# Patient Record
Sex: FEMALE | Race: BLACK OR AFRICAN AMERICAN | NOT HISPANIC OR LATINO
[De-identification: names, ages, dates, MRNs, and addresses within clinical notes are randomized per-mention and may not be internally consistent; named-entity substitution may affect disease eponyms.]

---

## 2018-03-14 PROBLEM — Z00.00 ENCOUNTER FOR PREVENTIVE HEALTH EXAMINATION: Status: ACTIVE | Noted: 2018-03-14

## 2018-03-29 ENCOUNTER — RECORD ABSTRACTING (OUTPATIENT)
Age: 43
End: 2018-03-29

## 2018-03-29 DIAGNOSIS — Z78.9 OTHER SPECIFIED HEALTH STATUS: ICD-10-CM

## 2018-03-29 DIAGNOSIS — Z83.3 FAMILY HISTORY OF DIABETES MELLITUS: ICD-10-CM

## 2018-03-29 DIAGNOSIS — Z82.49 FAMILY HISTORY OF ISCHEMIC HEART DISEASE AND OTHER DISEASES OF THE CIRCULATORY SYSTEM: ICD-10-CM

## 2018-05-02 ENCOUNTER — RX RENEWAL (OUTPATIENT)
Age: 43
End: 2018-05-02

## 2018-05-02 ENCOUNTER — APPOINTMENT (OUTPATIENT)
Dept: OBGYN | Facility: CLINIC | Age: 43
End: 2018-05-02
Payer: COMMERCIAL

## 2018-05-02 VITALS
DIASTOLIC BLOOD PRESSURE: 64 MMHG | WEIGHT: 167 LBS | BODY MASS INDEX: 32.79 KG/M2 | SYSTOLIC BLOOD PRESSURE: 122 MMHG | HEIGHT: 60 IN

## 2018-05-02 VITALS — HEIGHT: 65 IN

## 2018-05-02 LAB
HCG UR QL: NEGATIVE
QUALITY CONTROL: YES

## 2018-05-02 PROCEDURE — 81025 URINE PREGNANCY TEST: CPT

## 2018-05-02 PROCEDURE — 99213 OFFICE O/P EST LOW 20 MIN: CPT

## 2018-06-05 ENCOUNTER — RECORD ABSTRACTING (OUTPATIENT)
Age: 43
End: 2018-06-05

## 2018-09-17 ENCOUNTER — RX RENEWAL (OUTPATIENT)
Age: 43
End: 2018-09-17

## 2018-10-10 ENCOUNTER — RX RENEWAL (OUTPATIENT)
Age: 43
End: 2018-10-10

## 2019-02-11 ENCOUNTER — APPOINTMENT (OUTPATIENT)
Dept: OBGYN | Facility: CLINIC | Age: 44
End: 2019-02-11

## 2019-03-15 ENCOUNTER — RX RENEWAL (OUTPATIENT)
Age: 44
End: 2019-03-15

## 2019-03-26 ENCOUNTER — RX RENEWAL (OUTPATIENT)
Age: 44
End: 2019-03-26

## 2019-03-26 ENCOUNTER — RX CHANGE (OUTPATIENT)
Age: 44
End: 2019-03-26

## 2019-04-01 ENCOUNTER — APPOINTMENT (OUTPATIENT)
Dept: OBGYN | Facility: CLINIC | Age: 44
End: 2019-04-01
Payer: COMMERCIAL

## 2019-04-01 VITALS
BODY MASS INDEX: 30.49 KG/M2 | DIASTOLIC BLOOD PRESSURE: 68 MMHG | WEIGHT: 183 LBS | SYSTOLIC BLOOD PRESSURE: 124 MMHG | HEIGHT: 65 IN

## 2019-04-01 PROCEDURE — 99213 OFFICE O/P EST LOW 20 MIN: CPT

## 2019-04-01 PROCEDURE — 81025 URINE PREGNANCY TEST: CPT

## 2019-04-01 NOTE — DISCUSSION/SUMMARY
[FreeTextEntry1] : a44 years old black  female  here for follow up ,she wants to get her period only every 3months only will start on seasoneque physical and pelvic exam wnl  i spent 15 minutes with the patient

## 2019-04-02 LAB
HCG UR QL: NEGATIVE
QUALITY CONTROL: YES

## 2019-04-16 ENCOUNTER — APPOINTMENT (OUTPATIENT)
Dept: MAMMOGRAPHY | Facility: CLINIC | Age: 44
End: 2019-04-16
Payer: COMMERCIAL

## 2019-04-16 PROCEDURE — 77063 BREAST TOMOSYNTHESIS BI: CPT

## 2019-04-16 PROCEDURE — 77067 SCR MAMMO BI INCL CAD: CPT

## 2019-06-29 ENCOUNTER — RX RENEWAL (OUTPATIENT)
Age: 44
End: 2019-06-29

## 2019-08-26 ENCOUNTER — APPOINTMENT (OUTPATIENT)
Dept: OBGYN | Facility: CLINIC | Age: 44
End: 2019-08-26
Payer: COMMERCIAL

## 2019-08-26 VITALS
DIASTOLIC BLOOD PRESSURE: 74 MMHG | HEIGHT: 65 IN | BODY MASS INDEX: 29.99 KG/M2 | SYSTOLIC BLOOD PRESSURE: 126 MMHG | WEIGHT: 180 LBS

## 2019-08-26 DIAGNOSIS — Z87.42 PERSONAL HISTORY OF OTHER DISEASES OF THE FEMALE GENITAL TRACT: ICD-10-CM

## 2019-08-26 LAB
HCG UR QL: NEGATIVE
QUALITY CONTROL: YES

## 2019-08-26 PROCEDURE — 99396 PREV VISIT EST AGE 40-64: CPT

## 2019-08-27 PROBLEM — Z87.42 HISTORY OF IRREGULAR MENSTRUAL CYCLES: Status: RESOLVED | Noted: 2018-03-29 | Resolved: 2019-08-27

## 2019-08-27 RX ORDER — LEVONORGESTREL / ETHINYL ESTRADIOL AND ETHINYL ESTRADIOL 150-30(84)
0.15-0.03 &0.01 KIT ORAL DAILY
Qty: 91 | Refills: 1 | Status: DISCONTINUED | COMMUNITY
Start: 2019-04-01 | End: 2019-08-27

## 2019-08-27 RX ORDER — LEVONORGESTREL AND ETHINYL ESTRADIOL 0.15-0.03
0.15-0.03 KIT ORAL DAILY
Qty: 90 | Refills: 0 | Status: DISCONTINUED | COMMUNITY
Start: 2018-10-10 | End: 2019-08-27
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
## 2019-08-27 NOTE — DISCUSSION/SUMMARY
[Combined Oral Contraception] : combined oral contraception [Pregnancy Prevention] : pregnancy prevention [Bleeding Control] : bleeding control

## 2019-08-27 NOTE — HISTORY OF PRESENT ILLNESS
[1 Year Ago] : 1 year ago [Good] : being in good health [Healthy Diet] : a healthy diet [Pap Smear Last Year] : Papanicolaou cytology last year [Last Mammogram ___] : last mammogram done [unfilled] [Performed Within 5 Years] : lipid profile performed within the past five years [Reproductive Age] : is of reproductive age [Never Performed] : no previous DEXA [Contraception] : uses contraception [Regular Exercise] : not exercising regularly [Menstrual Problems] : reports normal menses [Fever] : no fever [Vomiting] : no vomiting [Nausea] : no nausea [Diarrhea] : no diarrhea [Vaginal Bleeding] : no vaginal bleeding [Pelvic Pressure] : no pelvic pressure [Dysuria] : no dysuria

## 2019-08-27 NOTE — PHYSICAL EXAM
[Awake] : awake [Alert] : alert [Soft] : soft [Oriented x3] : oriented to person, place, and time [No Bleeding] : there was no active vaginal bleeding [Normal] : uterus [Uterine Adnexae] : were not tender and not enlarged [Acute Distress] : no acute distress [Mass] : no breast mass [Nipple Discharge] : no nipple discharge [Tender] : non tender [Axillary LAD] : no axillary lymphadenopathy

## 2019-08-28 LAB — HPV HIGH+LOW RISK DNA PNL CVX: NOT DETECTED

## 2019-08-30 LAB — CYTOLOGY CVX/VAG DOC THIN PREP: NORMAL

## 2019-09-03 ENCOUNTER — OTHER (OUTPATIENT)
Age: 44
End: 2019-09-03

## 2019-09-03 RX ORDER — NORGESTREL AND ETHINYL ESTRADIOL 0.3-0.03MG
0.3-3 KIT ORAL DAILY
Qty: 90 | Refills: 1 | Status: DISCONTINUED | COMMUNITY
Start: 2018-05-02 | End: 2019-09-03

## 2019-09-03 RX ORDER — NORGESTREL AND ETHINYL ESTRADIOL 0.3-0.03MG
0.3-3 KIT ORAL DAILY
Qty: 3 | Refills: 0 | Status: DISCONTINUED | COMMUNITY
Start: 2019-03-25 | End: 2019-09-03

## 2019-09-03 RX ORDER — NORGESTREL AND ETHINYL ESTRADIOL 0.3-0.03MG
0.3-3 KIT ORAL DAILY
Qty: 3 | Refills: 1 | Status: DISCONTINUED | COMMUNITY
Start: 2019-03-20 | End: 2019-09-03

## 2019-11-06 ENCOUNTER — MEDICATION RENEWAL (OUTPATIENT)
Age: 44
End: 2019-11-06

## 2020-05-18 ENCOUNTER — APPOINTMENT (OUTPATIENT)
Dept: OBGYN | Facility: CLINIC | Age: 45
End: 2020-05-18
Payer: COMMERCIAL

## 2020-05-18 PROCEDURE — 99213 OFFICE O/P EST LOW 20 MIN: CPT | Mod: 95

## 2020-07-24 ENCOUNTER — APPOINTMENT (OUTPATIENT)
Dept: ULTRASOUND IMAGING | Facility: CLINIC | Age: 45
End: 2020-07-24
Payer: COMMERCIAL

## 2020-07-24 ENCOUNTER — APPOINTMENT (OUTPATIENT)
Dept: MAMMOGRAPHY | Facility: CLINIC | Age: 45
End: 2020-07-24
Payer: COMMERCIAL

## 2020-07-24 ENCOUNTER — RESULT REVIEW (OUTPATIENT)
Age: 45
End: 2020-07-24

## 2020-07-24 PROCEDURE — 77063 BREAST TOMOSYNTHESIS BI: CPT

## 2020-07-24 PROCEDURE — 77067 SCR MAMMO BI INCL CAD: CPT

## 2020-07-24 PROCEDURE — 76641 ULTRASOUND BREAST COMPLETE: CPT | Mod: 50

## 2020-08-31 ENCOUNTER — APPOINTMENT (OUTPATIENT)
Dept: OBGYN | Facility: CLINIC | Age: 45
End: 2020-08-31
Payer: COMMERCIAL

## 2020-08-31 VITALS
DIASTOLIC BLOOD PRESSURE: 64 MMHG | HEIGHT: 65 IN | SYSTOLIC BLOOD PRESSURE: 120 MMHG | BODY MASS INDEX: 29.99 KG/M2 | WEIGHT: 180 LBS | TEMPERATURE: 98.1 F

## 2020-08-31 PROCEDURE — 99396 PREV VISIT EST AGE 40-64: CPT

## 2020-08-31 NOTE — CHIEF COMPLAINT
[Annual Visit] : annual visit [FreeTextEntry1] : 46 y/o P2 for annual and ocp refill. Pap was NILM,neg HPV 8/2019. MAmmo was Birad 1 7/2020.\par Pt on ocp desires to continue\par Reports she has HSV which comes in the form of one sore in the same place approx 4x a year. Takes one Valtrex that is prescribed by her primary MD and it goes away\par Accepts gc.ct today-no other STI testing done\par No breast or ovarian cancer in family

## 2020-08-31 NOTE — PHYSICAL EXAM
[Awake] : awake [Alert] : alert [Acute Distress] : no acute distress [Thyroid Nodule] : no thyroid nodule [LAD] : no lymphadenopathy [Mass] : no breast mass [Goiter] : no goiter [Nipple Discharge] : no nipple discharge [Axillary LAD] : no axillary lymphadenopathy [Soft] : soft [Tender] : non tender [Oriented x3] : oriented to person, place, and time [Normal] : uterus [No Bleeding] : there was no active vaginal bleeding [Uterine Adnexae] : were not tender and not enlarged

## 2020-09-02 LAB
C TRACH RRNA SPEC QL NAA+PROBE: NOT DETECTED
N GONORRHOEA RRNA SPEC QL NAA+PROBE: NOT DETECTED
SOURCE AMPLIFICATION: NORMAL

## 2021-09-15 ENCOUNTER — APPOINTMENT (OUTPATIENT)
Dept: OBGYN | Facility: CLINIC | Age: 46
End: 2021-09-15
Payer: COMMERCIAL

## 2021-09-15 VITALS
BODY MASS INDEX: 29.16 KG/M2 | WEIGHT: 175 LBS | SYSTOLIC BLOOD PRESSURE: 106 MMHG | DIASTOLIC BLOOD PRESSURE: 80 MMHG | HEIGHT: 65 IN

## 2021-09-15 PROCEDURE — 99396 PREV VISIT EST AGE 40-64: CPT

## 2021-09-15 NOTE — HISTORY OF PRESENT ILLNESS
[FreeTextEntry1] : Angie 47 y/o pronounced "Richie" presents for annual exam without complaints. \par Her son is a senior in high school playing football and preparing for high school-very involved in that\par Monthly menses not to heavy-on ocp and desires to continue\par Taking suppressive therapy as prescribed and went from an outbreak every other month to none this past year\par Mammo was BIRAD 1 6/2020\par Never had a colonoscopy-\par Sexuallyactive with one partner-declines STI testing

## 2021-09-15 NOTE — PHYSICAL EXAM
[Appropriately responsive] : appropriately responsive [Alert] : alert [No Acute Distress] : no acute distress [No Lymphadenopathy] : no lymphadenopathy [No Murmurs] : no murmurs [Soft] : soft [Non-tender] : non-tender [Non-distended] : non-distended [No HSM] : No HSM [No Lesions] : no lesions [No Mass] : no mass [Oriented x3] : oriented x3 [Examination Of The Breasts] : a normal appearance [No Masses] : no breast masses were palpable [Labia Minora] : normal [Labia Majora] : normal [Normal] : normal [Normal Position] : in a normal position [Enlarged ___ wks] : enlarged [unfilled] ~Uweeks [Uterine Adnexae] : non-palpable

## 2021-09-15 NOTE — DISCUSSION/SUMMARY
[FreeTextEntry1] : 45 y/o with normal exam except uterus palpates enlarged-pt not having HMB/IMB but will perform sono \par MAmmo rx'd\par No pap collected-NILM,neg HPV in 2019 and never had an abnomral pap\par Information regarding cologuard given and pt given GI referral for first colonoscopy\par Refilling ocp and valtrex

## 2021-10-11 ENCOUNTER — ASOB RESULT (OUTPATIENT)
Age: 46
End: 2021-10-11

## 2021-10-11 ENCOUNTER — APPOINTMENT (OUTPATIENT)
Dept: OBGYN | Facility: CLINIC | Age: 46
End: 2021-10-11
Payer: COMMERCIAL

## 2021-10-11 PROCEDURE — 76856 US EXAM PELVIC COMPLETE: CPT | Mod: 59

## 2021-10-11 PROCEDURE — 76830 TRANSVAGINAL US NON-OB: CPT

## 2021-10-26 ENCOUNTER — APPOINTMENT (OUTPATIENT)
Dept: OBGYN | Facility: CLINIC | Age: 46
End: 2021-10-26

## 2021-11-09 ENCOUNTER — APPOINTMENT (OUTPATIENT)
Dept: OBGYN | Facility: CLINIC | Age: 46
End: 2021-11-09

## 2022-05-24 ENCOUNTER — APPOINTMENT (OUTPATIENT)
Dept: ULTRASOUND IMAGING | Facility: CLINIC | Age: 47
End: 2022-05-24

## 2022-05-24 ENCOUNTER — RESULT REVIEW (OUTPATIENT)
Age: 47
End: 2022-05-24

## 2022-05-24 ENCOUNTER — APPOINTMENT (OUTPATIENT)
Dept: MAMMOGRAPHY | Facility: CLINIC | Age: 47
End: 2022-05-24
Payer: COMMERCIAL

## 2022-05-24 PROCEDURE — 77067 SCR MAMMO BI INCL CAD: CPT

## 2022-05-24 PROCEDURE — 76641 ULTRASOUND BREAST COMPLETE: CPT | Mod: 50

## 2022-05-24 PROCEDURE — 77063 BREAST TOMOSYNTHESIS BI: CPT

## 2022-09-21 ENCOUNTER — NON-APPOINTMENT (OUTPATIENT)
Age: 47
End: 2022-09-21

## 2022-09-21 ENCOUNTER — RX RENEWAL (OUTPATIENT)
Age: 47
End: 2022-09-21

## 2022-09-21 ENCOUNTER — APPOINTMENT (OUTPATIENT)
Dept: OBGYN | Facility: CLINIC | Age: 47
End: 2022-09-21

## 2022-09-21 VITALS
WEIGHT: 187 LBS | HEART RATE: 72 BPM | SYSTOLIC BLOOD PRESSURE: 110 MMHG | TEMPERATURE: 99.1 F | BODY MASS INDEX: 31.12 KG/M2 | DIASTOLIC BLOOD PRESSURE: 78 MMHG

## 2022-09-21 PROCEDURE — 99396 PREV VISIT EST AGE 40-64: CPT

## 2022-09-21 NOTE — PLAN
[FreeTextEntry1] : unremarkable CBE and pelvic exams\par SBE taught and encouraged monthly\par Pap collected\par Mammo ordered\par  I reviewed measures for maintaining optimal bone density\par The importance of a screening colonoscopy was discussed.\par Renewed OCPs and Valacyclovir\par RTO x 1 year or prn\par \par  Patient verbalizes understanding of and agreement with this plan.  All questions answered to patient's satisfaction.\par

## 2022-09-21 NOTE — HISTORY OF PRESENT ILLNESS
[Patient reported mammogram was normal] : Patient reported mammogram was normal [Patient reported breast sonogram was normal] : Patient reported breast sonogram was normal [Patient reported PAP Smear was normal] : Patient reported PAP Smear was normal [N] : Patient reports normal menses [Oral Contraceptive] : uses oral contraception pills [Y] : Positive pregnancy history [TextBox_4] : 47 y.o. for annual exam. Hx of genital HSV. Currently on Low-Ogestrel for contraception.   Bi-Rads 2 mammo/breast US 5/24/22.  Last Pap smear 2019 was negative [Mammogramdate] : 05/22 [BreastSonogramDate] : 05/22 [PapSmeardate] : 2019 [LMPDate] : 8/30/22 [MensesFreq] : 28 [MensesLength] : 5 [PGHxTotal] : 2 [[facility]] : 2 [[facility]] : 2

## 2022-09-26 ENCOUNTER — RX RENEWAL (OUTPATIENT)
Age: 47
End: 2022-09-26

## 2022-09-26 LAB — BACTERIA UR CULT: ABNORMAL

## 2022-09-28 LAB
BILIRUB UR QL STRIP: NORMAL
CLARITY UR: CLEAR
COLLECTION METHOD: NORMAL
CYTOLOGY CVX/VAG DOC THIN PREP: NORMAL
GLUCOSE UR-MCNC: NORMAL
HCG UR QL: 1 EU/DL
HGB UR QL STRIP.AUTO: NORMAL
KETONES UR-MCNC: NORMAL
LEUKOCYTE ESTERASE UR QL STRIP: NORMAL
NITRITE UR QL STRIP: NORMAL
PH UR STRIP: 7
PROT UR STRIP-MCNC: NORMAL
SP GR UR STRIP: 1020

## 2023-05-15 ENCOUNTER — APPOINTMENT (OUTPATIENT)
Dept: RADIOLOGY | Facility: CLINIC | Age: 48
End: 2023-05-15
Payer: COMMERCIAL

## 2023-05-15 PROCEDURE — 71046 X-RAY EXAM CHEST 2 VIEWS: CPT

## 2023-05-25 ENCOUNTER — APPOINTMENT (OUTPATIENT)
Dept: ORTHOPEDIC SURGERY | Facility: CLINIC | Age: 48
End: 2023-05-25
Payer: COMMERCIAL

## 2023-05-25 VITALS
HEIGHT: 65 IN | WEIGHT: 175 LBS | BODY MASS INDEX: 29.16 KG/M2 | HEART RATE: 72 BPM | DIASTOLIC BLOOD PRESSURE: 78 MMHG | SYSTOLIC BLOOD PRESSURE: 110 MMHG

## 2023-05-25 PROCEDURE — 76882 US LMTD JT/FCL EVL NVASC XTR: CPT | Mod: RT

## 2023-05-25 PROCEDURE — 99203 OFFICE O/P NEW LOW 30 MIN: CPT

## 2023-05-25 RX ORDER — QUINIDINE SULFATE 200 MG
TABLET ORAL
Refills: 0 | Status: ACTIVE | COMMUNITY

## 2023-05-25 RX ORDER — ASCORBIC ACID 500 MG
TABLET ORAL
Refills: 0 | Status: ACTIVE | COMMUNITY

## 2023-05-25 NOTE — CONSULT LETTER
[Dear  ___] : Dear  [unfilled], [Consult Letter:] : I had the pleasure of evaluating your patient, [unfilled]. [Please see my note below.] : Please see my note below. [Consult Closing:] : Thank you very much for allowing me to participate in the care of this patient.  If you have any questions, please do not hesitate to contact me. [Sincerely,] : Sincerely, [FreeTextEntry3] : Jean Claude Conway M.D.\par Surgery of the Hand & Upper Extremity\par Orthopaedic Surgery\par Chief, Hand Service, [facility]\par Director, Hand Service, [facility]\par  of Orthopedic Surgery, [facility] [employer] [facility] at [facility] \par [facility] HospitalEmail: [email or web address]\par Office Phone: [phone]\par

## 2023-05-25 NOTE — HISTORY OF PRESENT ILLNESS
[Right] : right hand dominant [FreeTextEntry1] : She comes in today for evaluation of right middle finger lump that started to grow about a month ago. The patient's right middle finger is positive for swelling and is tender to touch. Furthermore, she is unable to fit ring. Moreover, her pain level is 0/10 and is worse with use and touch.\par \par She is a  at an office.\par \par She was referred by Dr. Sanchez\par

## 2023-05-25 NOTE — PHYSICAL EXAM
[de-identified] : -Constitutional: This is a female  in no obvious distress.  \par -Psych: Patient is alert and oriented to person, place and time.  Patient has a normal mood and affect.\par -Cardiovascular: Normal pulses throughout the upper extremities.  No significant varicosities are noted in the upper extremities. \par -Neuro: Strength and sensation are intact throughout the upper extremities.  Patient has normal coordination.\par -Respiratory:  Patient exhibits no evidence of shortness of breath or difficulty breathing.\par -Skin: No rashes, lesions, or other abnormalities are noted in the upper extremities.\par ---\par \par Examination of her right hand middle finger demonstrates an area of swelling along the proximal and ulnar aspect of the proximal phalanx palmarly.  This appears possibly consistent with a ganglion cyst but this is indeterminate.  She has full flexion and extension.  She is neurovascular intact distally. [de-identified] : A diagnostic ultrasound of her right middle finger is indeterminate whether or not this represents a fluid-filled cyst versus a solid mass.

## 2023-05-25 NOTE — ADDENDUM
[FreeTextEntry1] : I, Cecilia Sharma, acted solely as a scribe for Dr. Conway on this date on 05/25/2023.		\par

## 2023-05-25 NOTE — END OF VISIT
[FreeTextEntry3] : This note was written by Cecilia Sharma on 05/25/2023 acting solely as a scribe for Dr. Jean Claude Conway.\par  \par All medical record entries made by the Scribe were at my, Dr. Jean Claude Conway, direction and personally dictated by me on 05/25/2023. I have personally reviewed the chart and agree that the record accurately reflects my personal performance of the history, physical exam, assessment and plan.		\par

## 2023-05-25 NOTE — DISCUSSION/SUMMARY
[FreeTextEntry1] : She has findings consistent with a right middle finger lump for the past month either consistent with a ganglion cyst or possibly other benign etiologies such as a giant cell tumor of tendon sheath.\par \par I had a discussion with the patient regarding today's visit, the prognosis of this diagnosis, and treatment recommendations and options. At this time, I recommended an MRI to better evaluate the area of swelling.  She will follow-up after the MRI to review results and discuss treatment recommendations.\par \par The patient has agreed to the above plan of management and has expressed full understanding.  All questions were fully answered to the patient's satisfaction.\par \par My cumulative time spent on this visit included: Preparation for the visit, review of the medical records, review of pertinent diagnostic studies, examination and counseling of the patient on the above diagnosis, treatment plan and prognosis, orders of diagnostic tests, medication and/or appropriate procedures and documentation in the medical records of today's visit.		\par

## 2023-06-13 ENCOUNTER — APPOINTMENT (OUTPATIENT)
Dept: CARDIOLOGY | Facility: CLINIC | Age: 48
End: 2023-06-13
Payer: COMMERCIAL

## 2023-06-13 VITALS
SYSTOLIC BLOOD PRESSURE: 122 MMHG | DIASTOLIC BLOOD PRESSURE: 80 MMHG | OXYGEN SATURATION: 98 % | BODY MASS INDEX: 32.12 KG/M2 | WEIGHT: 193 LBS | HEART RATE: 52 BPM

## 2023-06-13 DIAGNOSIS — R92.2 INCONCLUSIVE MAMMOGRAM: ICD-10-CM

## 2023-06-13 DIAGNOSIS — Z78.9 OTHER SPECIFIED HEALTH STATUS: ICD-10-CM

## 2023-06-13 DIAGNOSIS — A60.09 HERPESVIRAL INFECTION OF OTHER UROGENITAL TRACT: ICD-10-CM

## 2023-06-13 DIAGNOSIS — M67.441 GANGLION, RIGHT HAND: ICD-10-CM

## 2023-06-13 DIAGNOSIS — N85.2 HYPERTROPHY OF UTERUS: ICD-10-CM

## 2023-06-13 PROCEDURE — 99205 OFFICE O/P NEW HI 60 MIN: CPT

## 2023-06-13 NOTE — ASSESSMENT
[FreeTextEntry1] : Progressive shortness of breath -I recommend echocardiography for LV size, wall motion, LVEF and valve evaluation.  I also recommended ETT to see initial symptoms and evaluation of functional capacity and ischemia.\par \par Tachycardia -Zio patch for 3 days, avoid caffeine products, no medication at this point.\par \par Suspect sleep apnea syndrome -Home sleep study.\par \par Obesity -weight regular exercise.\par \par Aggressive risk factor modification has been discussed with the patient at great length including regular walking.  She will be reeval by me after cardiac testing.

## 2023-06-13 NOTE — HISTORY OF PRESENT ILLNESS
[FreeTextEntry1] : A 48 years old .  American female patient who has developed shortness of breath on little more than usual exertion came for evaluation.  Lately whenever she goes to second floor her heart beats very fast and she has to rest.  She denies any active chest pain.  Denies PND, orthopnea, diaphoresis, dizziness, pedal edema, claudications.\par \par Lately she has restarted intermittent fasting.\par \par No prior CHF, MI, syncope, diabetes, hypertension, dyslipidemia or smoking.\par \par She offers symptoms of sleep and syndrome including loud habitual snoring, witnessed apnea episodes, daytime fatigue/somnolence.

## 2023-06-19 ENCOUNTER — APPOINTMENT (OUTPATIENT)
Dept: CARDIOLOGY | Facility: CLINIC | Age: 48
End: 2023-06-19
Payer: COMMERCIAL

## 2023-06-19 PROCEDURE — 93242 EXT ECG>48HR<7D RECORDING: CPT

## 2023-06-21 ENCOUNTER — NON-APPOINTMENT (OUTPATIENT)
Age: 48
End: 2023-06-21

## 2023-06-28 ENCOUNTER — OUTPATIENT (OUTPATIENT)
Dept: OUTPATIENT SERVICES | Facility: HOSPITAL | Age: 48
LOS: 1 days | End: 2023-06-28
Payer: COMMERCIAL

## 2023-06-28 DIAGNOSIS — G47.33 OBSTRUCTIVE SLEEP APNEA (ADULT) (PEDIATRIC): ICD-10-CM

## 2023-06-28 PROCEDURE — 95800 SLP STDY UNATTENDED: CPT

## 2023-06-29 ENCOUNTER — APPOINTMENT (OUTPATIENT)
Dept: ORTHOPEDIC SURGERY | Facility: CLINIC | Age: 48
End: 2023-06-29
Payer: COMMERCIAL

## 2023-06-29 PROCEDURE — 99213 OFFICE O/P EST LOW 20 MIN: CPT

## 2023-06-29 PROCEDURE — 73140 X-RAY EXAM OF FINGER(S): CPT | Mod: F7

## 2023-06-29 NOTE — PHYSICAL EXAM
[de-identified] : -Constitutional: This is a female  in no obvious distress.  \par -Psych: Patient is alert and oriented to person, place and time.  Patient has a normal mood and affect.\par -Cardiovascular: Normal pulses throughout the upper extremities.  No significant varicosities are noted in the upper extremities. \par -Neuro: Strength and sensation are intact throughout the upper extremities.  Patient has normal coordination.\par -Respiratory:  Patient exhibits no evidence of shortness of breath or difficulty breathing.\par -Skin: No rashes, lesions, or other abnormalities are noted in the upper extremities.\par ---\par \par Examination of her right hand middle finger demonstrates an area of swelling along the proximal and ulnar aspect of the proximal phalanx palmarly.  This appears possibly consistent with a ganglion cyst but this is indeterminate.  She has full flexion and extension.  She is neurovascularly intact distally. [de-identified] : AP, lateral and oblique radiographs of her right middle finger demonstrate no bony or soft tissue abnormalities.\par \par A diagnostic ultrasound of her right middle finger dated 5/25/2023 demonstrated indeterminate whether or not this represents a fluid-filled cyst versus a solid mass.

## 2023-06-29 NOTE — HISTORY OF PRESENT ILLNESS
[FreeTextEntry1] : Follow-up regarding right middle finger mass.  See note from when she was in the office 5 weeks ago.  I ordered an MRI.  \par \par She returns today, as the MRI was not approved without an x-ray.  I did not order an x-ray, as this was clearly a soft tissue mass which did not require a plain radiograph.

## 2023-06-29 NOTE — DISCUSSION/SUMMARY
[FreeTextEntry1] : I had a discussion regarding today's visit, the diagnosis and treatment recommendations and options.  We also discussed changes since the last visit.  At this time, as the plain x-ray was within normal limits, she will now go for the MRI which will be approved, now that she has a plain x-ray.  She will follow-up after the MRI to review the results and discuss treatment recommendations.\par \par The patient has agreed to the above plan of management and has expressed full understanding.  All questions were fully answered to the patient's satisfaction.\par \par My cumulative time spent on today's visit included: Preparation for the visit, review of the medical records, review of pertinent diagnostic studies, examination and counseling of the patient on the above diagnosis, treatment plan and prognosis, orders of diagnostic tests, medications and/or appropriate procedures and documentation in the medical records of today's visit.

## 2023-07-24 ENCOUNTER — APPOINTMENT (OUTPATIENT)
Dept: CARDIOLOGY | Facility: CLINIC | Age: 48
End: 2023-07-24
Payer: COMMERCIAL

## 2023-07-24 PROCEDURE — 93306 TTE W/DOPPLER COMPLETE: CPT

## 2023-07-24 PROCEDURE — 76376 3D RENDER W/INTRP POSTPROCES: CPT

## 2023-08-10 ENCOUNTER — APPOINTMENT (OUTPATIENT)
Dept: CARDIOLOGY | Facility: CLINIC | Age: 48
End: 2023-08-10
Payer: COMMERCIAL

## 2023-08-10 PROCEDURE — 93018 CV STRESS TEST I&R ONLY: CPT

## 2023-08-17 ENCOUNTER — APPOINTMENT (OUTPATIENT)
Dept: CARDIOLOGY | Facility: CLINIC | Age: 48
End: 2023-08-17
Payer: COMMERCIAL

## 2023-08-17 VITALS
SYSTOLIC BLOOD PRESSURE: 104 MMHG | OXYGEN SATURATION: 95 % | DIASTOLIC BLOOD PRESSURE: 80 MMHG | HEART RATE: 84 BPM | BODY MASS INDEX: 30.95 KG/M2 | WEIGHT: 186 LBS

## 2023-08-17 DIAGNOSIS — D21.11 BENIGN NEOPLASM OF CONNECTIVE AND OTHER SOFT TISSUE OF RIGHT UPPER LIMB, INCLUDING SHOULDER: ICD-10-CM

## 2023-08-17 DIAGNOSIS — R53.83 OTHER FATIGUE: ICD-10-CM

## 2023-08-17 DIAGNOSIS — R06.02 SHORTNESS OF BREATH: ICD-10-CM

## 2023-08-17 DIAGNOSIS — R00.0 TACHYCARDIA, UNSPECIFIED: ICD-10-CM

## 2023-08-17 PROCEDURE — 99214 OFFICE O/P EST MOD 30 MIN: CPT

## 2023-08-17 NOTE — ASSESSMENT
[FreeTextEntry1] : Progressive shortness of breath   Echo confirmed LV size, wall motion, LVEF 60 % and valve morphology.   ETT did not show inducible ischemia and has good functional capacity.  Tachycardia -Zio patch  confirmed normal HR range , avoid caffeine products, no medication at this point.  Suspect sleep apnea syndrome -Home sleep study confirmed only mild JULIA.  Obesity -weight regular exercise.  Aggressive risk factor modification has been discussed with the patient at great length including regular walking.  She will be reeval by me in 1 year,

## 2023-08-17 NOTE — HISTORY OF PRESENT ILLNESS
[FreeTextEntry1] : A 48 years old .  American female patient who has developed shortness of breath on little more than usual exertion came for evaluation.  Lately whenever she goes to second floor her heart beats very fast and she has to rest.  She denies any active chest pain.  Denies PND, orthopnea, diaphoresis, dizziness, pedal edema, claudications.  Today she is here for cardiac testing follow-up.  She is here for cardiac testing follow-up. June 28, 2023   sleep study confirmed mild sleep apnea syndrome. June 19, 2023    Zio patch confirmed heart rate range 60-1 45 beats minute, average 90 beats minute she had PACs and PVCs but no PAF or any other significant arrhythmias. June 13, 2023    ETT was done using Bakari protocol; she exercised for 8 minutes with peak heart rate 169 bpm, peak blood pressure 182/92 mmHg, she had no symptoms, no EKG changes; exercise Lion treadmill score 8 which is low risk of July 26, 2023   echocardiogram showed normal size, LV thickness, wall motion, LVEF 60% with normal valvular morphology.  Lately she has restarted intermittent fasting.  No prior CHF, MI, syncope, diabetes, hypertension, dyslipidemia or smoking.  She offers symptoms of sleep and syndrome including loud habitual snoring, witnessed apnea episodes, daytime fatigue/somnolence.

## 2023-08-20 ENCOUNTER — RX RENEWAL (OUTPATIENT)
Age: 48
End: 2023-08-20

## 2023-08-25 ENCOUNTER — APPOINTMENT (OUTPATIENT)
Dept: CARDIOLOGY | Facility: CLINIC | Age: 48
End: 2023-08-25

## 2023-09-24 PROBLEM — Z01.419 WOMEN'S ANNUAL ROUTINE GYNECOLOGICAL EXAMINATION: Status: ACTIVE | Noted: 2020-08-31

## 2023-09-25 ENCOUNTER — APPOINTMENT (OUTPATIENT)
Dept: OBGYN | Facility: CLINIC | Age: 48
End: 2023-09-25
Payer: COMMERCIAL

## 2023-09-25 VITALS
DIASTOLIC BLOOD PRESSURE: 72 MMHG | HEIGHT: 65 IN | WEIGHT: 181 LBS | SYSTOLIC BLOOD PRESSURE: 114 MMHG | BODY MASS INDEX: 30.16 KG/M2

## 2023-09-25 DIAGNOSIS — Z01.419 ENCOUNTER FOR GYNECOLOGICAL EXAMINATION (GENERAL) (ROUTINE) W/OUT ABNORMAL FINDINGS: ICD-10-CM

## 2023-09-25 PROCEDURE — 99396 PREV VISIT EST AGE 40-64: CPT

## 2023-11-06 ENCOUNTER — APPOINTMENT (OUTPATIENT)
Dept: ANTEPARTUM | Facility: CLINIC | Age: 48
End: 2023-11-06
Payer: COMMERCIAL

## 2023-11-06 ENCOUNTER — ASOB RESULT (OUTPATIENT)
Age: 48
End: 2023-11-06

## 2023-11-06 PROCEDURE — 76830 TRANSVAGINAL US NON-OB: CPT

## 2023-11-06 PROCEDURE — 76856 US EXAM PELVIC COMPLETE: CPT | Mod: 59

## 2023-12-12 ENCOUNTER — RESULT REVIEW (OUTPATIENT)
Age: 48
End: 2023-12-12

## 2023-12-12 ENCOUNTER — APPOINTMENT (OUTPATIENT)
Dept: MAMMOGRAPHY | Facility: CLINIC | Age: 48
End: 2023-12-12
Payer: COMMERCIAL

## 2023-12-12 ENCOUNTER — APPOINTMENT (OUTPATIENT)
Dept: ULTRASOUND IMAGING | Facility: CLINIC | Age: 48
End: 2023-12-12

## 2023-12-12 PROCEDURE — 77063 BREAST TOMOSYNTHESIS BI: CPT

## 2023-12-12 PROCEDURE — 76641 ULTRASOUND BREAST COMPLETE: CPT | Mod: 50

## 2023-12-12 PROCEDURE — 77067 SCR MAMMO BI INCL CAD: CPT

## 2024-03-25 RX ORDER — NORGESTREL AND ETHINYL ESTRADIOL 0.3-0.03MG
0.3-3 KIT ORAL
Qty: 3 | Refills: 2 | Status: ACTIVE | COMMUNITY
Start: 2019-03-15 | End: 1900-01-01

## 2024-05-20 NOTE — REVIEW OF SYSTEMS
44-year-old female who had a week long history of nausea and intractable vomiting which she suspected to be due to her recent red bull use.  She had dizziness, lightheadedness, and hypotension at the urgent care center earlier today.  She was given IV hydration after her blood work showed evidence of acute kidney injury.  CT abdomen and pelvis showing no acute abnormalities.  IV hydration  Urinary retention protocol  Renal ultrasound    Recent Labs     05/20/24  1455   BUN 46*   CREATININE 5.11*   EGFR 9       Results from last 7 days   Lab Units 05/20/24  1627   BLOOD UA  Small*   PROTEIN UA mg/dl 30 (1+)*        [Patient Intake Form Reviewed] : Patient intake form was reviewed

## 2024-05-21 ENCOUNTER — RX RENEWAL (OUTPATIENT)
Age: 49
End: 2024-05-21

## 2024-05-21 RX ORDER — VALACYCLOVIR 500 MG/1
500 TABLET, FILM COATED ORAL
Qty: 90 | Refills: 0 | Status: ACTIVE | COMMUNITY
Start: 2020-08-31 | End: 1900-01-01

## 2024-12-02 ENCOUNTER — APPOINTMENT (OUTPATIENT)
Dept: OBGYN | Facility: CLINIC | Age: 49
End: 2024-12-02
Payer: COMMERCIAL

## 2024-12-02 VITALS
DIASTOLIC BLOOD PRESSURE: 82 MMHG | HEIGHT: 65 IN | BODY MASS INDEX: 29.99 KG/M2 | WEIGHT: 180 LBS | SYSTOLIC BLOOD PRESSURE: 140 MMHG

## 2024-12-02 DIAGNOSIS — Z30.41 ENCOUNTER FOR SURVEILLANCE OF CONTRACEPTIVE PILLS: ICD-10-CM

## 2024-12-02 DIAGNOSIS — Z01.419 ENCOUNTER FOR GYNECOLOGICAL EXAMINATION (GENERAL) (ROUTINE) W/OUT ABNORMAL FINDINGS: ICD-10-CM

## 2024-12-02 PROCEDURE — 99396 PREV VISIT EST AGE 40-64: CPT

## 2024-12-02 PROCEDURE — 99212 OFFICE O/P EST SF 10 MIN: CPT | Mod: 25

## 2024-12-02 RX ORDER — DROSPIRENONE 4 MG/1
4 TABLET, FILM COATED ORAL DAILY
Qty: 3 | Refills: 3 | Status: ACTIVE | COMMUNITY
Start: 2024-12-02 | End: 1900-01-01

## 2024-12-03 LAB — HPV HIGH+LOW RISK DNA PNL CVX: NOT DETECTED

## 2024-12-04 ENCOUNTER — NON-APPOINTMENT (OUTPATIENT)
Age: 49
End: 2024-12-04

## 2024-12-04 LAB — CYTOLOGY CVX/VAG DOC THIN PREP: NORMAL

## 2024-12-10 RX ORDER — NORETHINDRONE 0.35 MG/1
0.35 TABLET ORAL DAILY
Qty: 3 | Refills: 3 | Status: ACTIVE | COMMUNITY
Start: 2024-12-10 | End: 1900-01-01

## 2025-02-10 ENCOUNTER — RESULT REVIEW (OUTPATIENT)
Age: 50
End: 2025-02-10

## 2025-02-10 ENCOUNTER — APPOINTMENT (OUTPATIENT)
Dept: MAMMOGRAPHY | Facility: CLINIC | Age: 50
End: 2025-02-10
Payer: COMMERCIAL

## 2025-02-10 ENCOUNTER — APPOINTMENT (OUTPATIENT)
Dept: ULTRASOUND IMAGING | Facility: CLINIC | Age: 50
End: 2025-02-10

## 2025-02-10 PROCEDURE — 76641 ULTRASOUND BREAST COMPLETE: CPT | Mod: 50

## 2025-02-10 PROCEDURE — 77067 SCR MAMMO BI INCL CAD: CPT

## 2025-02-10 PROCEDURE — 77063 BREAST TOMOSYNTHESIS BI: CPT

## 2025-09-15 ENCOUNTER — APPOINTMENT (OUTPATIENT)
Dept: RADIOLOGY | Facility: CLINIC | Age: 50
End: 2025-09-15
Payer: COMMERCIAL

## 2025-09-15 PROCEDURE — 77080 DXA BONE DENSITY AXIAL: CPT
